# Patient Record
Sex: MALE | Race: ASIAN | NOT HISPANIC OR LATINO | Employment: OTHER | URBAN - METROPOLITAN AREA
[De-identification: names, ages, dates, MRNs, and addresses within clinical notes are randomized per-mention and may not be internally consistent; named-entity substitution may affect disease eponyms.]

---

## 2020-09-29 ENCOUNTER — TELEMEDICINE (OUTPATIENT)
Dept: GASTROENTEROLOGY | Facility: CLINIC | Age: 55
End: 2020-09-29
Payer: COMMERCIAL

## 2020-09-29 VITALS — WEIGHT: 155 LBS | BODY MASS INDEX: 24.33 KG/M2 | HEIGHT: 67 IN

## 2020-09-29 DIAGNOSIS — Z12.11 COLON CANCER SCREENING: ICD-10-CM

## 2020-09-29 DIAGNOSIS — K22.70 BARRETT'S ESOPHAGUS WITHOUT DYSPLASIA: ICD-10-CM

## 2020-09-29 DIAGNOSIS — K21.9 GASTROESOPHAGEAL REFLUX DISEASE, ESOPHAGITIS PRESENCE NOT SPECIFIED: Primary | ICD-10-CM

## 2020-09-29 PROCEDURE — 99203 OFFICE O/P NEW LOW 30 MIN: CPT | Performed by: NURSE PRACTITIONER

## 2020-09-29 RX ORDER — AMLODIPINE AND VALSARTAN 5; 160 MG/1; MG/1
TABLET ORAL
COMMUNITY
Start: 2020-09-22

## 2020-09-29 RX ORDER — ATENOLOL 50 MG/1
50 TABLET ORAL DAILY
COMMUNITY
Start: 2020-09-22

## 2020-09-29 RX ORDER — OMEPRAZOLE 20 MG/1
20 CAPSULE, DELAYED RELEASE ORAL DAILY
COMMUNITY
End: 2020-10-14 | Stop reason: SDUPTHER

## 2020-09-29 NOTE — PROGRESS NOTES
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  Estevez's esophagus per GI evaluation in 2016  No Estevez's was identified with EGD in [facility] 2017  Symptoms well controlled with low-dose PPI daily  Was told to have EGD every 3 years for the original Estevez's diagnosis so he is due for surveillance  -continue omeprazole 20 mg daily  -reviewed, advised to continue reflux diet, lifestyle modifications  -GERD information was provided to the patient  -will schedule EGD-[facility]    -schedule 3 year Estevez's surveillance if biopsy confirms  -consider stopping PPI if no evidence of Estevez's with EGD since he is symptom-free  -will attempt to obtain EGD report [facility] [id number] [facility]  2017    3  Colon cancer screening  Average risk  Colonoscopy 05/07/2014 showing grade 2 internal hemorrhoids and some redness in the rectum, likely prep effect, otherwise normal   Ten year recall advised for screening -2024  Problem List Items Addressed This Visit        Digestive    Gastroesophageal reflux disease - Primary    Relevant Medications    omeprazole (PriLOSEC) 20 mg delayed release capsule       Other    Colon cancer screening           Reason for visit is history of GERD, discuss EGD for possible history of Estevez's esophagus  Chief Complaint   Patient presents with    Heartburn    Virtual Regular Visit        Encounter provider ABEL Gale    Provider located at [address] [address] [phone]      Recent Visits  No visits were found meeting these conditions     Showing recent visits within past 7 days and meeting all other requirements     Today's Visits  Date Type Provider Dept   09/29/20 Telemedicine ABEL Gale [facility]   Showing today's visits and meeting all other requirements     Future Appointments  No visits were found meeting these conditions  Showing future appointments within next 150 days and meeting all other requirements        The patient was identified by name and date of birth  Latanya Moe was informed that this is a telemedicine visit and that the visit is being conducted through [facility] [place] and patient was informed that this is not a secure, HIPAA-complaint platform  He agrees to proceed     My office door was closed  No one else was in the room  He acknowledged consent and understanding of privacy and security of the video platform  The patient has agreed to participate and understands they can discontinue the visit at any time  Patient is aware this is a billable service  Subjective  Latanya Moe is a 63 y o  male who is evaluated today for a history of GERD and possible Estevez's esophagus  Reports that he is due for his every 3 year EGD  Self reports that he was diagnosed with Estevez's esophagus per EGD resulted in 2016  That provider retired  Was symptom-free on daily PPI until 2017 at which time he was having a lot of heartburn  EGD done with Kishore MONTANEZ and self reports that there was no evidence of Estevez's with that EGD  Symptoms were well controlled on 40 mg ppi dosing so in 2018 he decreased to 20 mg daily dosing  Symptoms have been completely controlled since  Rare NSAID use, EtOH  Minimal caffeine  Denies dysphagia, odynophagia, early satiety, nausea or vomiting  Current having daily formed stools without straining  Denies melena, hematochezia, lower abdominal or rectal pain  Appetite is normal   Weight is stable      HPI     Past Medical History:   Diagnosis Date    Estevez esophagus     GERD (gastroesophageal reflux disease)     Hypertension        Past Surgical History:   Procedure Laterality Date    COLONOSCOPY  05/07/2014    Grade 2 internal hemorrhoids, slight redness in the rectal area which is likely prep effect but otherwise normal     UPPER GASTROINTESTINAL ENDOSCOPY  2017    Done for heartburn at [facility], possible Estevez's esophagus     Social History     Socioeconomic History    Marital status: /Civil Union     Spouse name: Not on file    Number of children: Not on file    Years of education: Not on file    Highest education level: Not on file   Occupational History    Not on file   Social Needs    Financial resource strain: Not on file    Food insecurity     Worry: Not on file     Inability: Not on file   [employer] needs     Medical: Not on file     Non-medical: Not on file   Tobacco Use    Smoking status: Never Smoker    Smokeless tobacco: Never Used   Substance and Sexual Activity    Alcohol use: Yes     Frequency: Monthly or less     Drinks per session: 1 or 2     Binge frequency: Never    Drug use: Never    Sexual activity: Not on file   Lifestyle    Physical activity     Days per week: Not on file     Minutes per session: Not on file    Stress: Not on file   Relationships    Social connections     Talks on phone: Not on file     Gets together: Not on file     Attends Jew service: Not on file     Active member of club or organization: Not on file     Attends meetings of clubs or organizations: Not on file     Relationship status: Not on file    Intimate partner violence     Fear of current or ex partner: Not on file     Emotionally abused: Not on file     Physically abused: Not on file     Forced sexual activity: Not on file   Other Topics Concern    Not on file   Social History Narrative    Not on file     Family History   Problem Relation Age of Onset    Hypertension Mother     Prostate cancer Father     No Known Problems Sister     No Known Problems Sister     Colon cancer Neg Hx     Colon polyps Neg Hx        Current Outpatient Medications   Medication Sig Dispense Refill    amLODIPine-valsartan (EXFORGE) 5-160 MG per tablet       atenolol (TENORMIN) 50 mg tablet Take 50 mg by mouth daily      omeprazole (PriLOSEC) 20 mg delayed release capsule Take 20 mg by mouth daily       No current facility-administered medications for this visit  No Known Allergies    Review of Systems     ROS:  All systems were reviewed and positives noted as per HPI  All other systems were reported as negative  Video Exam    Vitals:    09/29/20 1123   Weight: 70 3 kg (155 lb)   Height: 5' 7" (1 702 m)       Physical Exam     General Appearance: NAD, cooperative, alert  Head:  Normocephalic, atraumatic  Eyes: Anicteric, PERRLA, EOMI  ENT:  Normal external appearance, normal mucosa  Neck:  symmetrical, trachea midline  Resp:  respirations unlabored   GI: the patient appreciated no abdominal tenderness with self palpation within my view and the abdomen appeared nondistended  Rectal: Deferred  Musculoskeletal:  Normal ROM  Skin:     No jaundice, rashes, or lesions noted to visible areas    Psych: Normal affect, good eye contact  Neuro: No gross deficits, AAOx3    I spent 15 minutes with patient today in which greater than 50% of the time was spent in counseling/coordination of care regarding Review of complete history, symptoms, plan of care, patient Education    Follow-up in 4 weeks    VIRTUAL VISIT DISCLAIMER    Crystal Ly acknowledges that he has consented to an online visit or consultation  He understands that the online visit is based solely on information provided by him, and that, in the absence of a face-to-face physical evaluation by the physician, the diagnosis he receives is both limited and provisional in terms of accuracy and completeness  This is not intended to replace a full medical face-to-face evaluation by the physician  Crystal Ly understands and accepts these terms

## 2020-09-30 ENCOUNTER — TELEPHONE (OUTPATIENT)
Dept: GASTROENTEROLOGY | Facility: CLINIC | Age: 55
End: 2020-09-30

## 2020-09-30 ENCOUNTER — TELEMEDICINE (OUTPATIENT)
Dept: GASTROENTEROLOGY | Facility: CLINIC | Age: 55
End: 2020-09-30

## 2020-09-30 VITALS — HEIGHT: 67 IN | WEIGHT: 155 LBS | BODY MASS INDEX: 24.33 KG/M2

## 2020-09-30 DIAGNOSIS — K21.9 GASTROESOPHAGEAL REFLUX DISEASE, ESOPHAGITIS PRESENCE NOT SPECIFIED: Primary | ICD-10-CM

## 2020-09-30 NOTE — PROGRESS NOTES
Why does your doctor want you to have this procedure? reflux    Do you have kidney disease?  no  If yes, are you on dialysis :     Have you had diverticulitis within the past 2 months? no    Are you diabetic?  no  If yes, insulin dependent: If yes, provide diabetic instructions sheet     Do take iron supplements?  no  If yes, instruct patient to hold iron supplement for 7 days prior    Are you on a blood thinner? no   Was the blood thinner sheet complete and faxed to cardiologist no  Plavix (clopidogrel), Coumadin (warfarin), Lovenox (enoxaparin), Xarelto (rivaroxaban), Pradaxa(dabigatran), Eliquis(apixaban) Savaysa/Lixiana (edoxapan)    Do you have an automatic implantable cardiac defibrillator (AICD)/pacemaker ([facility])? no  Was AICD/pacemaker sheet completed and faxed to cardiologist? no    Are you on home oxygen? no  If yes, continuous or nocturnal:     Have you been treated for MRSA, VRE or any communicable diseases? no    Heart attack, stroke, or stent within 3 months? no  Schedule at Hospital if within 3-6 months   Use nitroglycerin for chest pain in the last 6 months? no    History of organ  transplant?  no   If yes, notify Endo      History of neck/throat/tongue surgery or cancer? no  IF yes, notify Endo      Any problems with anesthesia in the past? no     Was stool C diff ordered?  no Stool specimen needs to be completed prior to procedure    Do have any facial or body piercings?no     Do you have a latex allergy? no     Do have an allergy to metals?  (Bravo study only) no     If pediatric patient, was consent faxed to pediatrician no     Patient rights reviewed yes

## 2020-10-07 ENCOUNTER — ANESTHESIA EVENT (OUTPATIENT)
Dept: GASTROENTEROLOGY | Facility: AMBULATORY SURGERY CENTER | Age: 55
End: 2020-10-07

## 2020-10-07 ENCOUNTER — HOSPITAL ENCOUNTER (OUTPATIENT)
Dept: GASTROENTEROLOGY | Facility: AMBULATORY SURGERY CENTER | Age: 55
Discharge: HOME | End: 2020-10-07
Payer: COMMERCIAL

## 2020-10-07 ENCOUNTER — ANESTHESIA (OUTPATIENT)
Dept: GASTROENTEROLOGY | Facility: AMBULATORY SURGERY CENTER | Age: 55
End: 2020-10-07

## 2020-10-07 VITALS
TEMPERATURE: 97.7 F | RESPIRATION RATE: 18 BRPM | DIASTOLIC BLOOD PRESSURE: 48 MMHG | SYSTOLIC BLOOD PRESSURE: 104 MMHG | HEART RATE: 64 BPM | OXYGEN SATURATION: 99 %

## 2020-10-07 VITALS — HEART RATE: 84 BPM

## 2020-10-07 DIAGNOSIS — K21.9 GASTROESOPHAGEAL REFLUX DISEASE: ICD-10-CM

## 2020-10-07 DIAGNOSIS — K22.70 BARRETT'S ESOPHAGUS WITHOUT DYSPLASIA: ICD-10-CM

## 2020-10-07 PROBLEM — I10 ESSENTIAL HYPERTENSION: Status: ACTIVE | Noted: 2020-10-07

## 2020-10-07 PROCEDURE — 43239 EGD BIOPSY SINGLE/MULTIPLE: CPT | Performed by: INTERNAL MEDICINE

## 2020-10-07 RX ORDER — FAMOTIDINE 20 MG/1
20 TABLET, FILM COATED ORAL
Qty: 30 TABLET | Refills: 12 | Status: SHIPPED | OUTPATIENT
Start: 2020-10-07 | End: 2020-11-04 | Stop reason: SDUPTHER

## 2020-10-07 RX ORDER — SODIUM CHLORIDE 9 MG/ML
50 INJECTION, SOLUTION INTRAVENOUS CONTINUOUS
Status: DISCONTINUED | OUTPATIENT
Start: 2020-10-07 | End: 2020-10-07

## 2020-10-07 RX ORDER — PROPOFOL 10 MG/ML
INJECTION, EMULSION INTRAVENOUS AS NEEDED
Status: DISCONTINUED | OUTPATIENT
Start: 2020-10-07 | End: 2020-10-07

## 2020-10-07 RX ADMIN — SODIUM CHLORIDE 50 ML/HR: 9 INJECTION, SOLUTION INTRAVENOUS at 09:16

## 2020-10-07 RX ADMIN — PROPOFOL 50 MG: 10 INJECTION, EMULSION INTRAVENOUS at 09:57

## 2020-10-07 RX ADMIN — PROPOFOL 50 MG: 10 INJECTION, EMULSION INTRAVENOUS at 09:53

## 2020-10-07 RX ADMIN — PROPOFOL 150 MG: 10 INJECTION, EMULSION INTRAVENOUS at 09:49

## 2020-10-14 DIAGNOSIS — K21.9 GASTROESOPHAGEAL REFLUX DISEASE WITHOUT ESOPHAGITIS: Primary | ICD-10-CM

## 2020-10-14 RX ORDER — OMEPRAZOLE 20 MG/1
20 CAPSULE, DELAYED RELEASE ORAL DAILY
Qty: 30 CAPSULE | Refills: 12 | Status: SHIPPED | OUTPATIENT
Start: 2020-10-14 | End: 2020-11-04 | Stop reason: ALTCHOICE

## 2020-11-04 ENCOUNTER — TELEMEDICINE (OUTPATIENT)
Dept: GASTROENTEROLOGY | Facility: CLINIC | Age: 55
End: 2020-11-04
Payer: COMMERCIAL

## 2020-11-04 VITALS — BODY MASS INDEX: 24.33 KG/M2 | HEIGHT: 67 IN | WEIGHT: 155 LBS

## 2020-11-04 DIAGNOSIS — K21.9 GASTROESOPHAGEAL REFLUX DISEASE WITHOUT ESOPHAGITIS: ICD-10-CM

## 2020-11-04 DIAGNOSIS — K22.70 BARRETT'S ESOPHAGUS WITHOUT DYSPLASIA: Primary | ICD-10-CM

## 2020-11-04 DIAGNOSIS — Z12.11 COLON CANCER SCREENING: ICD-10-CM

## 2020-11-04 PROCEDURE — 99213 OFFICE O/P EST LOW 20 MIN: CPT | Performed by: NURSE PRACTITIONER

## 2020-11-04 RX ORDER — FAMOTIDINE 20 MG/1
20 TABLET, FILM COATED ORAL DAILY PRN
Qty: 90 TABLET | Refills: 1 | Status: SHIPPED | OUTPATIENT
Start: 2020-11-04

## 2021-03-30 DIAGNOSIS — Z23 ENCOUNTER FOR IMMUNIZATION: ICD-10-CM

## 2022-01-25 NOTE — PATIENT INSTRUCTIONS
Continue omeprazole 20 mg daily in the morning 20-30 minutes before you eat or drink  Continue smaller more frequent meals and avoid late-night eating ( within 2-3 hours of bed)  Continue to minimize fatty/fried foods, chocolate, caffeine, alcohol, NSAIDs ( ibuprofen /Motrin / Advil, Aleve /naproxen, full-dose aspirin)  Consider elevating the head of your bed at night with blocks or a mattress wedge  You will be scheduled for upper scope here at our endoscopy center    Gastroesophageal Reflux Disease   WHAT YOU NEED TO KNOW:   What is gastroesophageal reflux disease? Gastroesophageal reflux occurs when acid and food in the stomach back up into the esophagus  Gastroesophageal reflux disease (GERD) is reflux that occurs more than twice a week for a few weeks  It usually causes heartburn and other symptoms  GERD can cause other health problems over time if it is not treated  What causes GERD? GERD often occurs when the lower muscle (sphincter) of the esophagus does not close properly  The sphincter normally opens to let food into the stomach  It then closes to keep food and stomach acid in the stomach  If the sphincter does not close properly, stomach acid and food back up (reflux) into the esophagus  The following may increase your risk for GERD:  · Certain foods such as spicy foods, chocolate, foods that contain caffeine, peppermint, and fried foods    · Hiatal hernia    · Certain medicines such as calcium channel blockers (used to treat high blood pressure), allergy medicines, sedatives, or antidepressants    · Pregnancy or obesity    · Lying down after a meal    · Drinking alcohol or smoking  What are the signs and symptoms of GERD? Heartburn is the most common symptom of GERD  You may feel burning pain in your chest or below the breast bone  This usually occurs after meals and spreads to your neck, jaw, or shoulder  The pain gets better when you change positions   You may also have any of the following:  · Bitter or acid taste in your mouth    · Dry cough    · Trouble swallowing or pain with swallowing    · Hoarseness or sore throat    · Frequent burping or hiccups    · Feeling of fullness soon after you start eating  How is GERD diagnosed? Your healthcare provider will ask about your symptoms and when they started  Tell him or her about other medical conditions you have, your eating habits, and your activities  You may also need any of the following:  · Esophageal pH monitoring  is used to place a small probe inside your esophagus and stomach to check the amount of acid  · An endoscopy  is a procedure used to look at the inside of your esophagus and stomach  An endoscope is a bendable tube with a light and camera on the end  Your healthcare provider may remove a small sample of tissue and send it to a lab for tests  · Upper GI x-rays  are done to take pictures of your stomach and intestines (bowel)  You may be given a chalky liquid to drink before the pictures are taken  This liquid helps your stomach and intestines show up better on the x-rays  · Esophageal manometry  is a test that shows how your esophagus pushes food and fluid to your stomach  It also shows the pressures in your esophagus and stomach  It may show a hiatal hernia  How is GERD treated? · Medicines  are used to decrease stomach acid  Medicine may also be used to help your lower esophageal sphincter and stomach contract (tighten) more  · Surgery  is done to wrap the upper part of the stomach around the esophageal sphincter  This will strengthen the sphincter and prevent reflux  How can I help manage GERD? · Do not have foods or drinks that may increase heartburn  These include chocolate, peppermint, fried or fatty foods, drinks that contain caffeine, or carbonated drinks (soda)  Other foods include spicy foods, onions, tomatoes, and tomato-based foods   Do not have foods or drinks that can irritate your esophagus, such as citrus fruits, juices, and alcohol  · Do not eat large meals  When you eat a lot of food at one time, your stomach needs more acid to digest it  Eat 6 small meals each day instead of 3 large ones, and eat slowly  Do not eat meals 2 to 3 hours before bedtime  · Elevate the head of your bed  Place 6-inch blocks under the head of your bed frame  You may also use more than one pillow under your head and shoulders while you sleep  · Maintain a healthy weight  If you are overweight, weight loss may help relieve symptoms of GERD  · Do not smoke  Smoking weakens the lower esophageal sphincter and increases the risk of GERD  Ask your healthcare provider for information if you currently smoke and need help to quit  E-cigarettes or smokeless tobacco still contain nicotine  Talk to your healthcare provider before you use these products  · Do not wear clothing that is tight around your waist   Tight clothing can put pressure on your stomach and cause or worsen GERD symptoms  When should I seek immediate care? · You feel full and cannot burp or vomit  · You have severe chest pain and sudden trouble breathing  · Your bowel movements are black, bloody, or tarry-looking  · Your vomit looks like coffee grounds or has blood in it  When should I contact my healthcare provider? · You vomit large amounts, or you vomit often  · You have trouble breathing after you vomit  · You have trouble swallowing, or pain with swallowing  · You are losing weight without trying  · Your symptoms get worse or do not improve with treatment  · You have questions or concerns about your condition or care  CARE AGREEMENT:   You have the right to help plan your care  Learn about your health condition and how it may be treated  Discuss treatment options with your caregivers to decide what care you want to receive  You always have the right to refuse treatment   The above information is an educational aid only  It is not intended as medical advice for individual conditions or treatments  Talk to your doctor, nurse or pharmacist before following any medical regimen to see if it is safe and effective for you  © 2017 [employer] Information is for End User's use only and may not be sold, redistributed or otherwise used for commercial purposes  All illustrations and images included in CareNotes® are the copyrighted property of A [employer]  or Juaquin Carranza  intact